# Patient Record
Sex: FEMALE | Race: WHITE | NOT HISPANIC OR LATINO | Employment: FULL TIME | URBAN - METROPOLITAN AREA
[De-identification: names, ages, dates, MRNs, and addresses within clinical notes are randomized per-mention and may not be internally consistent; named-entity substitution may affect disease eponyms.]

---

## 2019-12-08 ENCOUNTER — HOSPITAL ENCOUNTER (EMERGENCY)
Facility: CLINIC | Age: 59
Discharge: HOME | End: 2019-12-08
Attending: EMERGENCY MEDICINE | Admitting: EMERGENCY MEDICINE
Payer: COMMERCIAL

## 2019-12-08 ENCOUNTER — APPOINTMENT (OUTPATIENT)
Dept: GENERAL RADIOLOGY | Facility: CLINIC | Age: 59
End: 2019-12-08
Attending: EMERGENCY MEDICINE
Payer: COMMERCIAL

## 2019-12-08 VITALS
BODY MASS INDEX: 27.6 KG/M2 | HEIGHT: 62 IN | OXYGEN SATURATION: 98 % | TEMPERATURE: 97.9 F | DIASTOLIC BLOOD PRESSURE: 119 MMHG | SYSTOLIC BLOOD PRESSURE: 142 MMHG | WEIGHT: 150 LBS | RESPIRATION RATE: 18 BRPM

## 2019-12-08 DIAGNOSIS — S90.32XA CONTUSION OF LEFT FOOT, INITIAL ENCOUNTER: ICD-10-CM

## 2019-12-08 PROCEDURE — 99284 EMERGENCY DEPT VISIT MOD MDM: CPT

## 2019-12-08 PROCEDURE — 73630 X-RAY EXAM OF FOOT: CPT | Mod: LT

## 2019-12-08 PROCEDURE — 99284 EMERGENCY DEPT VISIT MOD MDM: CPT | Mod: Z6 | Performed by: EMERGENCY MEDICINE

## 2019-12-08 ASSESSMENT — MIFFLIN-ST. JEOR: SCORE: 1198.65

## 2019-12-08 NOTE — ED AVS SNAPSHOT
[facility] [place] [facility]  [address] [address]  Phone:  [phone]  Fax:  [phone]                                    Laurence Crews   MRN: [id number]    Department:  [facility] [place] [facility]   Date of Visit:  12/8/2019           After Visit Summary Signature Page    I have received my discharge instructions, and my questions have been answered. I have discussed any challenges I see with this plan with the nurse or doctor.    ..........................................................................................................................................  Patient/Patient Representative Signature      ..........................................................................................................................................  Patient Representative Print Name and Relationship to Patient    ..................................................               ................................................  Date                                   Time    ..........................................................................................................................................  Reviewed by Signature/Title    ...................................................              ..............................................  Date                                               Time          22EPIC Rev 08/18

## 2019-12-08 NOTE — DISCHARGE INSTRUCTIONS
Ice, elevate, ibuprofen, postoperative shoe as needed    Recheck for persistent pain swelling or any other concern

## 2019-12-08 NOTE — ED PROVIDER NOTES
"  History     Chief Complaint   Patient presents with     Foot Pain     tripped over bencht yesterday, injured left foot left foot, pain with swelling,      HPI  Laurence Crews is a 59 year old female with history of hypertension, hyperlipidemia, erythema migrans, osteoporosis, depression, and graves disease who presents to the emergency department with foot pain. Patient reports left foot pain since last night after she tripped over a bench and struck the dorsum of her foot.  She developed swelling and ecchymosis, pain is described as moderate worse with bearing weight.  She is not on antiplatelet or anticoagulation therapy.  She is been icing every hour or so concerned that she may have broken her foot, as well as she is starting a new job tomorrow.  Denies pain or injury elsewhere peer    Allergies:  Allergies   Allergen Reactions     Codeine Itching     Demerol [Meperidine] Itching     Morphine Itching       Problem List:    There are no active problems to display for this patient.       Past Medical History:    No past medical history on file.    Past Surgical History:    No past surgical history on file.    Family History:    No family history on file.    Social History:  Marital Status:   [2]  Social History     Tobacco Use     Smoking status: Not on file   Substance Use Topics     Alcohol use: Not on file     Drug use: Not on file        Medications:    No current outpatient medications on file.        Review of Systems  Problem focused review of systems otherwise negative    Physical Exam   BP: (!) 142/119  Heart Rate: 79  Temp: 97.9  F (36.6  C)  Resp: 18  Height: 157.5 cm (5' 2\")  Weight: 68 kg (150 lb)  SpO2: 98 %      Physical Exam  Left foot exam moderate midfoot swelling dorsal ecchymosis with associated tenderness, no proximal fifth metatarsal tenderness, calcaneal tenderness, Achilles is intact, sensation intact pulses intact ankle is nontender and without swelling  ED Course      "   Procedures               Critical Care time:  none               Results for orders placed or performed during the hospital encounter of 12/08/19 (from the past 24 hour(s))   Foot XR, G/E 3 views, left    Narrative    XR FOOT LT G/E 3 VW 12/8/2019 8:47 AM     HISTORY: tripped yesterday, 4th and 5th  metatarsal swelling    COMPARISON: None.    FINDINGS: No fracture or dislocation. Mild hallux valgus and bunion.  Joint spaces and alignment otherwise intact.      Impression    IMPRESSION: No acute osseous abnormality demonstrated.     NAKITA LEMA MD       Medications - No data to display    Assessments & Plan (with Medical Decision Making)  Left foot contusion, ice, ibuprofen, elevate, postoperative shoe as needed.     I have reviewed the nursing notes.    I have reviewed the findings, diagnosis, plan and need for follow up with the patient.          New Prescriptions    No medications on file       Final diagnoses:   Contusion of left foot, initial encounter       12/8/2019   [facility] EMERGENCY DEPARTMENT     Colton Pearl MD  12/08/19 0915

## 2021-05-28 ENCOUNTER — RECORDS - HEALTHEAST (OUTPATIENT)
Dept: ADMINISTRATIVE | Facility: CLINIC | Age: 61
End: 2021-05-28

## 2021-05-29 ENCOUNTER — RECORDS - HEALTHEAST (OUTPATIENT)
Dept: ADMINISTRATIVE | Facility: CLINIC | Age: 61
End: 2021-05-29

## 2022-01-06 ENCOUNTER — LAB REQUISITION (OUTPATIENT)
Dept: LAB | Facility: CLINIC | Age: 62
End: 2022-01-06

## 2022-01-06 PROCEDURE — 86481 TB AG RESPONSE T-CELL SUSP: CPT | Performed by: INTERNAL MEDICINE

## 2022-01-06 PROCEDURE — 86762 RUBELLA ANTIBODY: CPT | Performed by: INTERNAL MEDICINE

## 2022-01-06 PROCEDURE — 86735 MUMPS ANTIBODY: CPT | Performed by: INTERNAL MEDICINE

## 2022-01-06 PROCEDURE — 86765 RUBEOLA ANTIBODY: CPT | Performed by: INTERNAL MEDICINE

## 2022-01-07 LAB
GAMMA INTERFERON BACKGROUND BLD IA-ACNC: 0.02 IU/ML
M TB IFN-G BLD-IMP: NEGATIVE
M TB IFN-G CD4+ BCKGRND COR BLD-ACNC: 3.26 IU/ML
MEV IGG SER IA-ACNC: 85.7 AU/ML
MEV IGG SER IA-ACNC: POSITIVE
MITOGEN IGNF BCKGRD COR BLD-ACNC: 0.01 IU/ML
MITOGEN IGNF BCKGRD COR BLD-ACNC: 0.03 IU/ML
MUMPS ANTIBODY IGG INSTRUMENT VALUE: <5 AU/ML
MUV IGG SER QL IA: NORMAL
QUANTIFERON MITOGEN: 3.28 IU/ML
QUANTIFERON NIL TUBE: 0.02 IU/ML
QUANTIFERON TB1 TUBE: 0.03 IU/ML
QUANTIFERON TB2 TUBE: 0.05
RUBV IGG SERPL QL IA: 1.48 INDEX
RUBV IGG SERPL QL IA: POSITIVE

## 2024-02-29 ENCOUNTER — TRANSCRIBE ORDERS (OUTPATIENT)
Dept: OTHER | Age: 64
End: 2024-02-29

## 2024-02-29 DIAGNOSIS — M25.512 BILATERAL SHOULDER PAIN: Primary | ICD-10-CM

## 2024-02-29 DIAGNOSIS — M25.511 BILATERAL SHOULDER PAIN: Primary | ICD-10-CM

## 2024-03-08 ENCOUNTER — THERAPY VISIT (OUTPATIENT)
Dept: PHYSICAL THERAPY | Facility: CLINIC | Age: 64
End: 2024-03-08
Attending: ORTHOPAEDIC SURGERY
Payer: COMMERCIAL

## 2024-03-08 DIAGNOSIS — M25.511 BILATERAL SHOULDER PAIN: Primary | ICD-10-CM

## 2024-03-08 DIAGNOSIS — M25.512 BILATERAL SHOULDER PAIN: Primary | ICD-10-CM

## 2024-03-08 PROCEDURE — 97161 PT EVAL LOW COMPLEX 20 MIN: CPT | Mod: GP | Performed by: PHYSICAL THERAPIST

## 2024-03-08 PROCEDURE — 97110 THERAPEUTIC EXERCISES: CPT | Mod: GP | Performed by: PHYSICAL THERAPIST

## 2024-03-08 NOTE — PROGRESS NOTES
PHYSICAL THERAPY EVALUATION  Type of Visit: Evaluation    See electronic medical record for Abuse and Falls Screening details.    Subjective       Presenting condition or subjective complaint:  Patient states that her L shoulder has been bothersome the last year, the R shoulder has been hurting the last two months. L shoulder is more of a pressure/something is moving in the upper shoulder, potentially muscle spasm. R shoulder is painful that travels down into medial elbow and pinky finger. Her lateral/anterior neck hurts and it feels hard. The uncomfortable positions when reaching R arm away from body, drinking water, getting dressed, and work tasks. Patient does go for walks regularly, recently started rowing but this doesn't bother her.   Date of onset: 24    Relevant medical history:   osteoporosis, vision problems, thyroid problems, HTN  Dates & types of surgery:      Prior diagnostic imaging/testing results:     Xray - unremarkable  Prior therapy history for the same diagnosis, illness or injury:        Living Environment  Social support:     Type of home:     Stairs to enter the home:         Ramp:     Stairs inside the home:         Help at home:    Equipment owned:       Employment:      Hobbies/Interests:      Patient goals for therapy:  lift items    Pain assessment: Pain present  Location: R shoulder /Ratin/10     Objective   SHOULDER EVALUATION  PAIN: Pain Level at Rest: 1/10  Pain Level with Use: 9/10  Pain Location: cervical spine, shoulder, and elbow  Pain Quality: Aching, Sharp, Shooting, Throbbing, and Tingling  Pain Frequency: intermittent  Pain is Worst: daytime  Pain is Exacerbated By: reaching, lifting, ADLs  Pain is Relieved By: NSAIDs  INTEGUMENTARY (edema, incisions): WNL  POSTURE: WNL  ROM:  L shoulder: flexion 135* no pain, 150* no pain, R shoulder: flexion 145* no pain, 160* no pain, ER/IR WNL pain with ER  STRENGTH:  ER R shoulder mild pain, pain with resisted  abduction  FLEXIBILITY: Decreased upper trap L, Decreased levator L, Decreased upper trap R, Decreased levator R, Decreased pectoralis minor R  SPECIAL TESTS:    Left Right   Impingement     Neer's Negative  Negative    Hawkin's-Fransico Negative  Positive   Coracoid Impingement Negative  Positive   Internal impingement Negative  Negative    Labral     Anterior Slide Negative  Negative    Buffalo's Negative  Negative         Multi-Directional Instability      Sulcus Negative  Negative    Biceps      Speed's Negative  Negative    Rotator Cuff Tear     Drop Arm Negative  Positive   Belly Press Negative  Negative    Lift off  Negative  Positive     PALPATION:  L posterior deltoid, R levator scap, supraspinatus, medial border of scap with significant thoracic paraspinal tension, coracoid and pec minor  JOINT MOBILITY: WNL  CERVICAL SCREEN: WNL    Assessment & Plan   CLINICAL IMPRESSIONS  Medical Diagnosis: Bilateral shoulder pain    Treatment Diagnosis: shoulder pain   Impression/Assessment: Patient is a 63 year old female with bilateral shoulder pain complaints.  The following significant findings have been identified: Pain, Decreased ROM/flexibility, Decreased joint mobility, Decreased strength, Impaired muscle performance, and Decreased activity tolerance. These impairments interfere with their ability to perform self care tasks, work tasks, recreational activities, household chores, and driving  as compared to previous level of function.     Clinical Decision Making (Complexity):  Clinical Presentation: Stable/Uncomplicated  Clinical Presentation Rationale: based on medical and personal factors listed in PT evaluation  Clinical Decision Making (Complexity): Low complexity    PLAN OF CARE  Treatment Interventions:  Modalities: E-stim  Interventions: Manual Therapy, Neuromuscular Re-education, Therapeutic Activity, Therapeutic Exercise, Self-Care/Home Management    Long Term Goals     PT Goal 1  Goal Identifier:  1.  Goal Description: Patient will demonstrate R shoulder WNL ROM in order to tolerate ADLs without pain.  Target Date: 04/05/24  PT Goal 2  Goal Identifier: 2.  Goal Description: Patient will tolerate overhead tasks without pain in order to drink and reach for glasses.  Target Date: 04/19/24  PT Goal 3  Goal Identifier: 3.  Goal Description: Patient will tolerate lifting moderately weighted items in order to enjoy recreational and work tasks.  Target Date: 05/03/24      Frequency of Treatment: 1x/week  Duration of Treatment: 12 weeks    Education Assessment:        Risks and benefits of evaluation/treatment have been explained.   Patient/Family/caregiver agrees with Plan of Care.     Evaluation Time:     PT Eval, Low Complexity Minutes (78971): 25     Signing Clinician: FIONA De La Rosa [facility]                                                                                   OUTPATIENT PHYSICAL THERAPY      PLAN OF TREATMENT FOR OUTPATIENT REHABILITATION   Patient's Last Name, First Name, Laurence Hart YOB: 1960   Provider's Name   [facility]   Medical Record No.  [id number]     Onset Date: 02/29/24  Start of Care Date: 03/08/24     Medical Diagnosis:  Bilateral shoulder pain      PT Treatment Diagnosis:  shoulder pain Plan of Treatment  Frequency/Duration: 1x/week/ 12 weeks    Certification date from 03/08/24 to 05/31/24         See note for plan of treatment details and functional goals     Tamanna Alcantara PT                         I CERTIFY THE NEED FOR THESE SERVICES FURNISHED UNDER        THIS PLAN OF TREATMENT AND WHILE UNDER MY CARE     (Physician attestation of this document indicates review and certification of the therapy plan).              Referring Provider:  Grey Lopes    Initial Assessment  See Epic Evaluation- Start of Care Date: 03/08/24

## 2024-03-15 ENCOUNTER — THERAPY VISIT (OUTPATIENT)
Dept: PHYSICAL THERAPY | Facility: CLINIC | Age: 64
End: 2024-03-15
Attending: ORTHOPAEDIC SURGERY
Payer: COMMERCIAL

## 2024-03-15 DIAGNOSIS — M25.511 BILATERAL SHOULDER PAIN: Primary | ICD-10-CM

## 2024-03-15 DIAGNOSIS — M25.512 BILATERAL SHOULDER PAIN: Primary | ICD-10-CM

## 2024-03-15 PROCEDURE — 97110 THERAPEUTIC EXERCISES: CPT | Mod: GP | Performed by: PHYSICAL THERAPIST

## 2024-03-21 ENCOUNTER — THERAPY VISIT (OUTPATIENT)
Dept: PHYSICAL THERAPY | Facility: CLINIC | Age: 64
End: 2024-03-21
Attending: ORTHOPAEDIC SURGERY
Payer: COMMERCIAL

## 2024-03-21 DIAGNOSIS — M25.511 BILATERAL SHOULDER PAIN: Primary | ICD-10-CM

## 2024-03-21 DIAGNOSIS — M25.512 BILATERAL SHOULDER PAIN: Primary | ICD-10-CM

## 2024-03-21 PROCEDURE — 97110 THERAPEUTIC EXERCISES: CPT | Mod: GP | Performed by: PHYSICAL THERAPIST

## 2024-04-18 ENCOUNTER — THERAPY VISIT (OUTPATIENT)
Dept: PHYSICAL THERAPY | Facility: CLINIC | Age: 64
End: 2024-04-18
Attending: ORTHOPAEDIC SURGERY
Payer: COMMERCIAL

## 2024-04-18 DIAGNOSIS — M25.512 BILATERAL SHOULDER PAIN: Primary | ICD-10-CM

## 2024-04-18 DIAGNOSIS — M25.511 BILATERAL SHOULDER PAIN: Primary | ICD-10-CM

## 2024-04-18 PROCEDURE — 97110 THERAPEUTIC EXERCISES: CPT | Mod: GP | Performed by: PHYSICAL THERAPIST

## 2024-04-18 NOTE — PROGRESS NOTES
DISCHARGE  Reason for Discharge: Patient has met all goals.  Patient chooses to discontinue therapy.    Equipment Issued: HEP    Discharge Plan: Patient to continue home program.    Referring Provider:  Grey Lopes

## 2024-04-27 ENCOUNTER — HEALTH MAINTENANCE LETTER (OUTPATIENT)
Age: 64
End: 2024-04-27

## 2025-05-11 ENCOUNTER — HEALTH MAINTENANCE LETTER (OUTPATIENT)
Age: 65
End: 2025-05-11

## 2025-05-30 ENCOUNTER — APPOINTMENT (OUTPATIENT)
Dept: CT IMAGING | Facility: CLINIC | Age: 65
End: 2025-05-30
Attending: EMERGENCY MEDICINE
Payer: COMMERCIAL

## 2025-05-30 ENCOUNTER — HOSPITAL ENCOUNTER (EMERGENCY)
Facility: CLINIC | Age: 65
Discharge: HOME | End: 2025-05-30
Attending: EMERGENCY MEDICINE | Admitting: EMERGENCY MEDICINE
Payer: COMMERCIAL

## 2025-05-30 ENCOUNTER — APPOINTMENT (OUTPATIENT)
Dept: MRI IMAGING | Facility: CLINIC | Age: 65
End: 2025-05-30
Attending: EMERGENCY MEDICINE
Payer: COMMERCIAL

## 2025-05-30 VITALS
SYSTOLIC BLOOD PRESSURE: 149 MMHG | OXYGEN SATURATION: 97 % | RESPIRATION RATE: 18 BRPM | TEMPERATURE: 98.2 F | HEART RATE: 78 BPM | DIASTOLIC BLOOD PRESSURE: 87 MMHG

## 2025-05-30 DIAGNOSIS — H53.129 TRANSIENT VISUAL LOSS, UNSPECIFIED LATERALITY: ICD-10-CM

## 2025-05-30 DIAGNOSIS — G43.809 OTHER MIGRAINE WITHOUT STATUS MIGRAINOSUS, NOT INTRACTABLE: ICD-10-CM

## 2025-05-30 LAB
ANION GAP SERPL CALCULATED.3IONS-SCNC: 12 MMOL/L (ref 7–15)
APTT PPP: 35 SECONDS (ref 22–38)
ATRIAL RATE - MUSE: 69 BPM
BASOPHILS # BLD AUTO: 0.1 10E3/UL (ref 0–0.2)
BASOPHILS NFR BLD AUTO: 1 %
BUN SERPL-MCNC: 17.6 MG/DL (ref 8–23)
CALCIUM SERPL-MCNC: 9.4 MG/DL (ref 8.8–10.4)
CHLORIDE SERPL-SCNC: 104 MMOL/L (ref 98–107)
CREAT SERPL-MCNC: 0.88 MG/DL (ref 0.51–0.95)
DIASTOLIC BLOOD PRESSURE - MUSE: NORMAL MMHG
EGFRCR SERPLBLD CKD-EPI 2021: 72 ML/MIN/1.73M2
EOSINOPHIL # BLD AUTO: 0.1 10E3/UL (ref 0–0.7)
EOSINOPHIL NFR BLD AUTO: 2 %
ERYTHROCYTE [DISTWIDTH] IN BLOOD BY AUTOMATED COUNT: 12.5 % (ref 10–15)
GLUCOSE SERPL-MCNC: 105 MG/DL (ref 70–99)
HCO3 SERPL-SCNC: 22 MMOL/L (ref 22–29)
HCT VFR BLD AUTO: 37.1 % (ref 35–47)
HGB BLD-MCNC: 12.5 G/DL (ref 11.7–15.7)
IMM GRANULOCYTES # BLD: 0 10E3/UL
IMM GRANULOCYTES NFR BLD: 0 %
INR PPP: 1.09 (ref 0.85–1.15)
INTERPRETATION ECG - MUSE: NORMAL
LYMPHOCYTES # BLD AUTO: 1.4 10E3/UL (ref 0.8–5.3)
LYMPHOCYTES NFR BLD AUTO: 27 %
MCH RBC QN AUTO: 30.5 PG (ref 26.5–33)
MCHC RBC AUTO-ENTMCNC: 33.7 G/DL (ref 31.5–36.5)
MCV RBC AUTO: 91 FL (ref 78–100)
MONOCYTES # BLD AUTO: 0.5 10E3/UL (ref 0–1.3)
MONOCYTES NFR BLD AUTO: 9 %
NEUTROPHILS # BLD AUTO: 3.1 10E3/UL (ref 1.6–8.3)
NEUTROPHILS NFR BLD AUTO: 61 %
NRBC # BLD AUTO: 0 10E3/UL
NRBC BLD AUTO-RTO: 0 /100
P AXIS - MUSE: 4 DEGREES
PLATELET # BLD AUTO: 223 10E3/UL (ref 150–450)
POTASSIUM SERPL-SCNC: 4.2 MMOL/L (ref 3.4–5.3)
PR INTERVAL - MUSE: 112 MS
PROTHROMBIN TIME: 14 SECONDS (ref 11.8–14.8)
QRS DURATION - MUSE: 86 MS
QT - MUSE: 404 MS
QTC - MUSE: 432 MS
R AXIS - MUSE: 20 DEGREES
RBC # BLD AUTO: 4.1 10E6/UL (ref 3.8–5.2)
SODIUM SERPL-SCNC: 138 MMOL/L (ref 135–145)
SYSTOLIC BLOOD PRESSURE - MUSE: NORMAL MMHG
T AXIS - MUSE: 23 DEGREES
TROPONIN T SERPL HS-MCNC: 7 NG/L
VENTRICULAR RATE- MUSE: 69 BPM
WBC # BLD AUTO: 5.1 10E3/UL (ref 4–11)

## 2025-05-30 PROCEDURE — 255N000002 HC RX 255 OP 636: Performed by: EMERGENCY MEDICINE

## 2025-05-30 PROCEDURE — 70450 CT HEAD/BRAIN W/O DYE: CPT

## 2025-05-30 PROCEDURE — 70553 MRI BRAIN STEM W/O & W/DYE: CPT

## 2025-05-30 PROCEDURE — 36415 COLL VENOUS BLD VENIPUNCTURE: CPT | Performed by: EMERGENCY MEDICINE

## 2025-05-30 PROCEDURE — 82374 ASSAY BLOOD CARBON DIOXIDE: CPT | Performed by: EMERGENCY MEDICINE

## 2025-05-30 PROCEDURE — 85730 THROMBOPLASTIN TIME PARTIAL: CPT | Performed by: EMERGENCY MEDICINE

## 2025-05-30 PROCEDURE — 250N000011 HC RX IP 250 OP 636: Performed by: EMERGENCY MEDICINE

## 2025-05-30 PROCEDURE — 99284 EMERGENCY DEPT VISIT MOD MDM: CPT | Performed by: EMERGENCY MEDICINE

## 2025-05-30 PROCEDURE — 93005 ELECTROCARDIOGRAM TRACING: CPT | Performed by: EMERGENCY MEDICINE

## 2025-05-30 PROCEDURE — 84484 ASSAY OF TROPONIN QUANT: CPT | Performed by: EMERGENCY MEDICINE

## 2025-05-30 PROCEDURE — 99285 EMERGENCY DEPT VISIT HI MDM: CPT | Mod: 25 | Performed by: EMERGENCY MEDICINE

## 2025-05-30 PROCEDURE — A9585 GADOBUTROL INJECTION: HCPCS | Performed by: EMERGENCY MEDICINE

## 2025-05-30 PROCEDURE — 70498 CT ANGIOGRAPHY NECK: CPT

## 2025-05-30 PROCEDURE — 93010 ELECTROCARDIOGRAM REPORT: CPT | Performed by: EMERGENCY MEDICINE

## 2025-05-30 PROCEDURE — 85025 COMPLETE CBC W/AUTO DIFF WBC: CPT | Performed by: EMERGENCY MEDICINE

## 2025-05-30 PROCEDURE — 85610 PROTHROMBIN TIME: CPT | Performed by: EMERGENCY MEDICINE

## 2025-05-30 PROCEDURE — 250N000009 HC RX 250: Performed by: EMERGENCY MEDICINE

## 2025-05-30 RX ORDER — IOPAMIDOL 755 MG/ML
67 INJECTION, SOLUTION INTRAVASCULAR ONCE
Status: COMPLETED | OUTPATIENT
Start: 2025-05-30 | End: 2025-05-30

## 2025-05-30 RX ORDER — GADOBUTROL 604.72 MG/ML
6.5 INJECTION INTRAVENOUS ONCE
Status: COMPLETED | OUTPATIENT
Start: 2025-05-30 | End: 2025-05-30

## 2025-05-30 RX ADMIN — IOPAMIDOL 67 ML: 755 INJECTION, SOLUTION INTRAVENOUS at 10:07

## 2025-05-30 RX ADMIN — GADOBUTROL 6.5 ML: 604.72 INJECTION INTRAVENOUS at 10:45

## 2025-05-30 RX ADMIN — SODIUM CHLORIDE 100 ML: 9 INJECTION, SOLUTION INTRAVENOUS at 10:07

## 2025-05-30 ASSESSMENT — ACTIVITIES OF DAILY LIVING (ADL)
ADLS_ACUITY_SCORE: 41

## 2025-05-30 ASSESSMENT — COLUMBIA-SUICIDE SEVERITY RATING SCALE - C-SSRS
2. HAVE YOU ACTUALLY HAD ANY THOUGHTS OF KILLING YOURSELF IN THE PAST MONTH?: NO
1. IN THE PAST MONTH, HAVE YOU WISHED YOU WERE DEAD OR WISHED YOU COULD GO TO SLEEP AND NOT WAKE UP?: NO
6. HAVE YOU EVER DONE ANYTHING, STARTED TO DO ANYTHING, OR PREPARED TO DO ANYTHING TO END YOUR LIFE?: NO

## 2025-05-30 NOTE — DISCHARGE INSTRUCTIONS
You were seen in the emergency department today with a chief complaint of an ocular migraine a couple of days ago and sent here for definitive imaging to rule out a stroke.  Your imaging today was reassuring.  I recommend that you follow-up with your primary care team to discuss additional management options.    Please come back with any changing or worsening symptoms that you find concerning otherwise continue your Tylenol and caffeine plan and maybe your primary care doctor can help you come up with a prophylactic medicine that will help reduce your incidence of migraines.

## 2025-05-30 NOTE — ED TRIAGE NOTES
Patient sent in by clinic due to ongoing headache that clears with tylenol and eye vision loss in left eye that has since resolved. Symptoms started on Tuesday.

## 2025-05-31 NOTE — ED PROVIDER NOTES
[facility]  Emergency Department Visit Note    PATIENT:  Laurence Crews     64 year old     female      [id number]    Chief complaint:  Chief Complaint   Patient presents with    Headache        History of present illness:  Patient is a 64 year old female with a past medical history significant for ocular migraines, increasing in frequency over the past couple of months presenting for evaluation of transient headache and vision loss 3 days ago.  Patient reports that 3 days ago she woke up and noticed a little bit of loss in her vision in her left eye.  Her vision loss was more complete than normal but not totally complete.  She did after this had a very severe migraine which was typical of her chronic migraines.  The part of it that was concerning to her was the extent of vision loss which is abnormal compared to her baseline.  Her symptoms resolved after she used caffeine and Tylenol which is what she typically treats her migraines with.  She also felt like she was maybe a little bit foggy that day which is abnormal for her migraines.  Her symptoms have been absent since however she was concerned about the increasing vision loss more than her baseline so she called her primary care provider and they recommended she come to the ER for a stroke workup.  Patient is otherwise feeling fine no chest pain, shortness of breath, difficulty breathing.  No urinary issues.  No recent fevers or chills.    Review of Systems:  As in HPI above    BP (!) 149/87   Pulse 78   Temp 98.2  F (36.8  C) (Oral)   Resp 18   SpO2 97%     Physical Exam  Constitutional: laying in hospital bed, alert, oriented, in no apparent distress, conversant, and answering questions appropriately  HEENT: normocephalic, atraumatic, pupils 3mm, equal, round, and reactive to light, sclerae anicteric, extraocular motions intact, and moist mucous membranes  Neck: able to fully range and no midline tenderness  Cardiovascular: regular  rate and rhythm  Pulmonary: breathing comfortably on room air and lungs clear to auscultation bilaterally  Abdominal: soft, non-tender, non-distended  Genitourinary: deferred  Extremities/MSK: no peripheral edema, no cyanosis , and no calf tenderness to palpation  Skin: warm, dry, non-diaphoretic, no rashes or lesions, and no mottling  Neurologic: moves all four extremities spontaneously, GCS 15, CNII-XII intact, 5/5  strength bilaterally, 5/5 strength in dorsiflexion and plantarflexion bilaterally, sensation intact to light touch in bilateral upper and lower extremities, ambulates without assistance, no dysmetria on finger-nose-finger, no pronator drift, no involuntary movements, and no rigidity or spasticity  Psychiatric: calm, appropriate      MDM:  Patient is a 64 year old female with above history presenting for evaluation of vision loss and headache 3 days ago, symptoms now resolved.    Vitals reassuring and within normal limits. Exam reassuring, no abnormalities appreciated.    I am most suspicious that patients symptoms are secondary to her ocular migraines considering her history of similar migraines and the almost identical nature of this headache and also the changing frequency of her migraines, concerning me that they are increasing in severity and frequency. However, differential for amaurosis fugax includes but is not limited to stroke, TIA, retinal detachment, CRAO, CRVO, hypoperfusion, giant cell arteritis, carotid artery dissection.     Plan for CT head neck, CTA, MRI brain to evaluate for the possibility of a stroke, or other vascular disease causing this.  This will also see whether or not the patient has retinal detachment however vision is normal as this is an unlikely.  Hypoperfusion is also unlikely considering patient's vital signs are normal and they likely were normal that day however will never know.  In addition possible giant cell arteritis however she has absolutely no pain right  now and she is not able to say whether or not she had pain over her face that day so unable to further evaluate for this today.  I am most suspicious that this is related to an ocular migraine but will obtain brain imaging and basic labs to screen for stroke    ECG:  - Ventricular rate 69 bpm, regular  - TX, QRS, QT intervals normal  - Axis normal  - no ST segment or T wave changes concerning for acute ischemia  - Comparison to prior ECGs: no prior  - My independent interpretation: overall reassuring and within normal limits in this context    Remainder of ED course below.    ED COURSE:  ED Course as of 06/01/25 1553   Sun Jun 01, 2025   1550 Troponin T, High Sensitivity: 7   1552 PTT: 35   1552 INR: 1.09   1552 Basic metabolic panel(!)  Reassuring and within normal limits   1552 CBC with Platelets & Differential  Reassuring and within normal limits   1552 MR Brain w/o & w Contrast  IMPRESSION:  1.  No findings to explain patient's symptoms. No acute intracranial pathology.  2.  Mild chronic small vessel ischemic disease.     1552 CT Head w/o Contrast  IMPRESSION:   HEAD CT:  1.  Negative for acute intracranial hemorrhage, transcortical infarct, hydrocephalus, or sizable intracranial mass.     HEAD CTA:   1.  Negative for large vessel occlusion, aneurysm, or high flow vascular malformation.     NECK CTA:  1.  Patent carotid and vertebral arteries. Negative for high-grade arterial stenosis or acute dissection.     1552 Overall patient's work of is normal.  No evidence of a stroke.  Discharging patient.  Instructed her to continue to treat her ocular migraines that she has at home and instructed her to follow-up with her primary care provider to discuss the possibility of additional intervention considering they are increasing in frequency and severity.  Patient expresses verbal understanding return precautions discussed all questions answered and discharged in stable condition       Encounter Diagnoses:  Final  diagnoses:   Transient visual loss, unspecified laterality   Other migraine without status migrainosus, not intractable       Final disposition: discharge    DO TIP Navarrete Physician  [facility]       Deepa Yi DO  06/01/25 1550

## 2025-07-31 ENCOUNTER — TRANSCRIBE ORDERS (OUTPATIENT)
Dept: OTHER | Age: 65
End: 2025-07-31

## 2025-07-31 DIAGNOSIS — M25.512 CHRONIC LEFT SHOULDER PAIN: Primary | ICD-10-CM

## 2025-07-31 DIAGNOSIS — M54.2 NECK PAIN: ICD-10-CM

## 2025-07-31 DIAGNOSIS — G89.29 CHRONIC LEFT SHOULDER PAIN: Primary | ICD-10-CM
